# Patient Record
Sex: FEMALE | Race: OTHER | HISPANIC OR LATINO
[De-identification: names, ages, dates, MRNs, and addresses within clinical notes are randomized per-mention and may not be internally consistent; named-entity substitution may affect disease eponyms.]

---

## 2017-03-23 ENCOUNTER — APPOINTMENT (OUTPATIENT)
Dept: INTERNAL MEDICINE | Facility: CLINIC | Age: 67
End: 2017-03-23

## 2017-03-23 VITALS
BODY MASS INDEX: 29.88 KG/M2 | TEMPERATURE: 97.9 F | OXYGEN SATURATION: 98 % | SYSTOLIC BLOOD PRESSURE: 120 MMHG | WEIGHT: 175 LBS | DIASTOLIC BLOOD PRESSURE: 70 MMHG | HEIGHT: 64 IN | HEART RATE: 68 BPM

## 2017-03-23 DIAGNOSIS — H93.13 TINNITUS, BILATERAL: ICD-10-CM

## 2017-03-30 ENCOUNTER — APPOINTMENT (OUTPATIENT)
Dept: INTERNAL MEDICINE | Facility: CLINIC | Age: 67
End: 2017-03-30

## 2017-05-10 ENCOUNTER — APPOINTMENT (OUTPATIENT)
Dept: OTOLARYNGOLOGY | Facility: CLINIC | Age: 67
End: 2017-05-10

## 2018-09-11 ENCOUNTER — EMERGENCY (EMERGENCY)
Facility: HOSPITAL | Age: 68
LOS: 1 days | Discharge: HOME | End: 2018-09-11
Attending: EMERGENCY MEDICINE
Payer: COMMERCIAL

## 2018-09-11 VITALS
DIASTOLIC BLOOD PRESSURE: 91 MMHG | HEART RATE: 72 BPM | OXYGEN SATURATION: 97 % | TEMPERATURE: 98 F | SYSTOLIC BLOOD PRESSURE: 137 MMHG | RESPIRATION RATE: 17 BRPM

## 2018-09-11 VITALS
TEMPERATURE: 98 F | DIASTOLIC BLOOD PRESSURE: 81 MMHG | WEIGHT: 171.96 LBS | HEART RATE: 91 BPM | OXYGEN SATURATION: 97 % | RESPIRATION RATE: 18 BRPM | SYSTOLIC BLOOD PRESSURE: 124 MMHG | HEIGHT: 61.02 IN

## 2018-09-11 PROCEDURE — 99283 EMERGENCY DEPT VISIT LOW MDM: CPT

## 2018-09-11 PROCEDURE — 99283 EMERGENCY DEPT VISIT LOW MDM: CPT | Mod: 25

## 2018-09-11 RX ADMIN — Medication 450 MILLIGRAM(S): at 04:51

## 2018-09-11 NOTE — ED PROVIDER NOTE - PLAN OF CARE
1. Perform warm compresses to R eye, 4 times a day for 15 minutes at a time  2. Take Clindamycin as prescribed, eat yogurt while taking  3. Follow-up with Ophthalmology clinic within next 2-3 days, call [phone] for appointment  4. Return immediately for any worsening or concerning symptoms including any change in vision, pain while moving eye, worsening swelling, bulging of eye, abnormal discharge, fevers/chills, headache, dizziness, or anything else that concerns you

## 2018-09-11 NOTE — ED PROVIDER NOTE - OBJECTIVE STATEMENT
68F no sig pmh presents with R lower eyelid pruritis, pain, swelling x 4 days. gradually worsening redness, swelling. denies pain with eye movement. no contact lens use. "feels like something in eye." no sig d/c. denies double or blurry vision. denies trauma. denies f/c. denies headache, dizziness, claudication. denies n/v/d. denies cp, sob, palpitations. otherwise feels well.

## 2018-09-11 NOTE — ED ADULT TRIAGE NOTE - CHIEF COMPLAINT QUOTE
"I was working last week and I had some dust on my right eye, during the week it was swollen and red. I feel pain"

## 2018-09-11 NOTE — ED PROVIDER NOTE - EYES, MLM
PERRLA, EOMI without pain, +erythema, edema, R lower eyelid, with associated lesion and papule c/w hordeolum, no discharge, +mild edema, ?warmth inferior to R eye, no proptosis. Visual acuity: OD 20/50, OS 20/30 (corrective lenses). Fluorescein stain R eye reveals no evidence of ulcer, abrasion, dendrites.

## 2018-09-11 NOTE — ED ADULT NURSE NOTE - CHPI ED NUR SYMPTOMS NEG
no bleeding gums/no chills/no weakness/no vomiting/no nausea/no loss of consciousness/no numbness/no syncope/no fever

## 2018-09-11 NOTE — ED PROVIDER NOTE - CARE PLAN
Principal Discharge DX:	Preseptal cellulitis of right lower eyelid  Assessment and plan of treatment:	1. Perform warm compresses to R eye, 4 times a day for 15 minutes at a time  2. Take Clindamycin as prescribed, eat yogurt while taking  3. Follow-up with Ophthalmology clinic within next 2-3 days, call [phone] for appointment  4. Return immediately for any worsening or concerning symptoms including any change in vision, pain while moving eye, worsening swelling, bulging of eye, abnormal discharge, fevers/chills, headache, dizziness, or anything else that concerns you  Secondary Diagnosis:	Hordeolum externum of right lower eyelid

## 2018-09-11 NOTE — ED ADULT NURSE NOTE - OBJECTIVE STATEMENT
68y female presents ot ED complaining of eye pain and discomfort. Pt states that on Friday she felt like she got some dust in her eye on Friday and since then has developed increased tearing, itching and pain. Pt is a/ox3 with red bump on lower eyelid of R eye. No changes in vision

## 2018-09-11 NOTE — ED PROVIDER NOTE - MEDICAL DECISION MAKING DETAILS
R lower eyelid lesion c/w hordeolum. No other lesions noted. Exam and hx not c/w orbital cellulitis. No findings c/w corneal abrasion. No sig visual acuity deficit. ?developing preseptal cellulitis, plan to treat with clindamycin. To d/c with conservative management and ophthalmology f/u. - Savage Patrick MD

## 2018-09-11 NOTE — ED PROVIDER NOTE - NS ED ROS FT
Constitutional: No fever or chills  Eyes: Per HPI  HEENT: No throat pain, no trismus, no sore throat, no earache  CV: No chest pain  Resp: No SOB no cough  GI: No abd pain, nausea or vomiting  MSK: No neck pain  Neuro: No headache

## 2018-09-12 NOTE — ED POST DISCHARGE NOTE - ADDITIONAL DOCUMENTATION
On review of chart, noted that clindamycin had incorreclty been sent to pharmacy to be taken QD instead of TID. tried calling several listed numbers, unable to get through. left message on pt's listed main number ([phone]) on HIE, left message on voicemail with instructions for correctly taking medication (450 mg TID x 7 days) and instructed to call [facility] ED with any questions. additional meds sent to patient's pharmacy. - Savage Patrick MD

## 2020-08-13 NOTE — ED PROVIDER NOTE - NS ED MD DISPO DISCHARGE CCDA
-We have discussed today with you Diabetes Management, recommendations as follow:   *Continue taking Glucophage  XR 2 grams at night deo night   *Start taking Jardiance 10 mgh oral every morning     *Stop taking Glimepiride     -Continue healthy lifestyle, Including diet and physical activity   -Continue taking antihypertensive medications: Irbesartan 150 mg oral daily and hydrochlorothiazide 25 mg oral daily   -Continue taking Metoprolol  oral daily, Atorvastatin 20 mg po daily, Aspirin 81 mg daily   -Will order blood work today and will follow in 1 months Patient/Caregiver provided printed discharge information.

## 2023-08-04 NOTE — ED ADULT NURSE NOTE - CHIEF COMPLAINT
Called patient to confirm patient has paper order for upcoming CT CAC Scan and patient wanted appointment cancelled. Reported had other testing done. Appointment cancelled per patient request.   The patient is a 68y Female complaining of

## 2023-09-03 ENCOUNTER — EMERGENCY (EMERGENCY)
Facility: HOSPITAL | Age: 73
LOS: 1 days | Discharge: HOME | End: 2023-09-03
Attending: EMERGENCY MEDICINE
Payer: MEDICARE

## 2023-09-03 VITALS
WEIGHT: 188.72 LBS | HEART RATE: 91 BPM | HEIGHT: 61.42 IN | RESPIRATION RATE: 15 BRPM | TEMPERATURE: 98 F | DIASTOLIC BLOOD PRESSURE: 83 MMHG | SYSTOLIC BLOOD PRESSURE: 147 MMHG | OXYGEN SATURATION: 96 %

## 2023-09-03 PROCEDURE — 99284 EMERGENCY DEPT VISIT MOD MDM: CPT

## 2023-09-03 PROCEDURE — 73562 X-RAY EXAM OF KNEE 3: CPT | Mod: 26,RT

## 2023-09-03 RX ORDER — KETOROLAC TROMETHAMINE 30 MG/ML
15 SYRINGE (ML) INJECTION ONCE
Refills: 0 | Status: DISCONTINUED | OUTPATIENT
Start: 2023-09-03 | End: 2023-09-03

## 2023-09-03 RX ORDER — ACETAMINOPHEN 500 MG
975 TABLET ORAL ONCE
Refills: 0 | Status: COMPLETED | OUTPATIENT
Start: 2023-09-03 | End: 2023-09-03

## 2023-09-04 LAB
ALBUMIN SERPL ELPH-MCNC: 3.4 G/DL — LOW (ref 3.5–5)
ALP SERPL-CCNC: 78 U/L — SIGNIFICANT CHANGE UP (ref 40–120)
ALT FLD-CCNC: 23 U/L DA — SIGNIFICANT CHANGE UP (ref 10–60)
ANION GAP SERPL CALC-SCNC: 4 MMOL/L — LOW (ref 5–17)
AST SERPL-CCNC: 15 U/L — SIGNIFICANT CHANGE UP (ref 10–40)
BASOPHILS # BLD AUTO: 0.04 K/UL — SIGNIFICANT CHANGE UP (ref 0–0.2)
BASOPHILS NFR BLD AUTO: 0.4 % — SIGNIFICANT CHANGE UP (ref 0–2)
BILIRUB SERPL-MCNC: 0.2 MG/DL — SIGNIFICANT CHANGE UP (ref 0.2–1.2)
BUN SERPL-MCNC: 20 MG/DL — HIGH (ref 7–18)
CALCIUM SERPL-MCNC: 9 MG/DL — SIGNIFICANT CHANGE UP (ref 8.4–10.5)
CHLORIDE SERPL-SCNC: 110 MMOL/L — HIGH (ref 96–108)
CO2 SERPL-SCNC: 26 MMOL/L — SIGNIFICANT CHANGE UP (ref 22–31)
CREAT SERPL-MCNC: 1 MG/DL — SIGNIFICANT CHANGE UP (ref 0.5–1.3)
D DIMER BLD IA.RAPID-MCNC: <150 NG/ML DDU — SIGNIFICANT CHANGE UP
EGFR: 60 ML/MIN/1.73M2 — SIGNIFICANT CHANGE UP
EOSINOPHIL # BLD AUTO: 0.21 K/UL — SIGNIFICANT CHANGE UP (ref 0–0.5)
EOSINOPHIL NFR BLD AUTO: 1.9 % — SIGNIFICANT CHANGE UP (ref 0–6)
GLUCOSE SERPL-MCNC: 115 MG/DL — HIGH (ref 70–99)
HCT VFR BLD CALC: 39.1 % — SIGNIFICANT CHANGE UP (ref 34.5–45)
HGB BLD-MCNC: 12.6 G/DL — SIGNIFICANT CHANGE UP (ref 11.5–15.5)
IMM GRANULOCYTES NFR BLD AUTO: 0.3 % — SIGNIFICANT CHANGE UP (ref 0–0.9)
LYMPHOCYTES # BLD AUTO: 35.5 % — SIGNIFICANT CHANGE UP (ref 13–44)
LYMPHOCYTES # BLD AUTO: 4.01 K/UL — HIGH (ref 1–3.3)
MCHC RBC-ENTMCNC: 30.1 PG — SIGNIFICANT CHANGE UP (ref 27–34)
MCHC RBC-ENTMCNC: 32.2 GM/DL — SIGNIFICANT CHANGE UP (ref 32–36)
MCV RBC AUTO: 93.3 FL — SIGNIFICANT CHANGE UP (ref 80–100)
MONOCYTES # BLD AUTO: 0.62 K/UL — SIGNIFICANT CHANGE UP (ref 0–0.9)
MONOCYTES NFR BLD AUTO: 5.5 % — SIGNIFICANT CHANGE UP (ref 2–14)
NEUTROPHILS # BLD AUTO: 6.4 K/UL — SIGNIFICANT CHANGE UP (ref 1.8–7.4)
NEUTROPHILS NFR BLD AUTO: 56.4 % — SIGNIFICANT CHANGE UP (ref 43–77)
NRBC # BLD: 0 /100 WBCS — SIGNIFICANT CHANGE UP (ref 0–0)
PLATELET # BLD AUTO: 298 K/UL — SIGNIFICANT CHANGE UP (ref 150–400)
POTASSIUM SERPL-MCNC: 4.4 MMOL/L — SIGNIFICANT CHANGE UP (ref 3.5–5.3)
POTASSIUM SERPL-SCNC: 4.4 MMOL/L — SIGNIFICANT CHANGE UP (ref 3.5–5.3)
PROT SERPL-MCNC: 7.4 G/DL — SIGNIFICANT CHANGE UP (ref 6–8.3)
RBC # BLD: 4.19 M/UL — SIGNIFICANT CHANGE UP (ref 3.8–5.2)
RBC # FLD: 13.9 % — SIGNIFICANT CHANGE UP (ref 10.3–14.5)
SODIUM SERPL-SCNC: 140 MMOL/L — SIGNIFICANT CHANGE UP (ref 135–145)
WBC # BLD: 11.31 K/UL — HIGH (ref 3.8–10.5)
WBC # FLD AUTO: 11.31 K/UL — HIGH (ref 3.8–10.5)

## 2023-09-04 PROCEDURE — 36415 COLL VENOUS BLD VENIPUNCTURE: CPT

## 2023-09-04 PROCEDURE — 85379 FIBRIN DEGRADATION QUANT: CPT

## 2023-09-04 PROCEDURE — 73562 X-RAY EXAM OF KNEE 3: CPT

## 2023-09-04 PROCEDURE — 85025 COMPLETE CBC W/AUTO DIFF WBC: CPT

## 2023-09-04 PROCEDURE — 99283 EMERGENCY DEPT VISIT LOW MDM: CPT | Mod: 25

## 2023-09-04 PROCEDURE — 80053 COMPREHEN METABOLIC PANEL: CPT

## 2023-09-04 RX ADMIN — Medication 975 MILLIGRAM(S): at 02:28

## 2023-09-04 NOTE — ED PROVIDER NOTE - NSFOLLOWUPINSTRUCTIONS_ED_ALL_ED_FT
Return to [facility] imaging center during daytime hours 9am-5pm for doppler study-bring requisition form with you.  For pain continue tylenol 650mg or ibuprofen 600mg every 6 hours.  Followup with PMD for reevaluation.  Return to ED if you develop worsening symptoms-high fevers, severe pain/swelling, chest pain or difficulty breathing.

## 2023-09-04 NOTE — ED PROVIDER NOTE - CLINICAL SUMMARY MEDICAL DECISION MAKING FREE TEXT BOX
73-year-old female with no past medical history presents with 20 days of right posterior knee pain. Discussed with patient unavailability of venous coppler study at this time.  lab interpretation- ddimer wnl, wbc 11k, Cr 1.0  imaging interpretation- R knee XR shows no displaced fracture/dislocation.  Discussed above with patient/family  patient stable for discharge with requisition form for venous doppler.

## 2023-09-04 NOTE — ED PROVIDER NOTE - PATIENT PORTAL LINK FT
You can access the FollowMyHealth Patient Portal offered by [facility] by registering at the following website: [email or web address]. By joining [employer]’s FollowMyHealth portal, you will also be able to view your health information using other applications (apps) compatible with our system.

## 2023-09-04 NOTE — ED PROVIDER NOTE - PHYSICAL EXAMINATION
MSK: Right lower extremity- Pain with knee flexion.  No tenderness, no erythema her lactate came forearm.  2+ DP pulse, cap refill less than 2 seconds at the distal toes.  Normal sensorimotor function of the foot. No calf ttp, neg homans sign.

## 2023-09-04 NOTE — ED ADULT NURSE NOTE - NSFALLUNIVINTERV_ED_ALL_ED
Bed/Stretcher in lowest position, wheels locked, appropriate side rails in place/Call bell, personal items and telephone in reach/Instruct patient to call for assistance before getting out of bed/chair/stretcher/Non-slip footwear applied when patient is off stretcher/[place] to call system/Physically safe environment - no spills, clutter or unnecessary equipment/Purposeful proactive rounding/Room/bathroom lighting operational, light cord in reach

## 2023-09-04 NOTE — ED PROVIDER NOTE - OBJECTIVE STATEMENT
73-year-old female with no past medical history presents with 20 days of right knee pain.  Patient reports pain behind her right knee for the last 20 days.  Denies any trauma.  Reports that it started after she spent a day cleaning out her basement and she was very active.  Denies similar pain in the past.  Does report some worsening swelling of the right leg associated with the pain.  Reports pain is exacerbated by walking and bending the knee.  Denies any color changes of her leg, weakness or numbness.  Denies any back pain.  Denies any recent chest pain or shortness of breath.  Denies fevers. Evaluation performed in [place]ian language by me.

## 2023-09-04 NOTE — ED ADULT NURSE NOTE - OBJECTIVE STATEMENT
As per patient c/o right leg pain x20 days. Patient reports RLE pain x20 days which worsened today. Pt denies any chest pain SOB, dyspnea on exertion. No apparent distress noted. Pt denies all other signs and symptoms.

## 2023-09-07 ENCOUNTER — OUTPATIENT (OUTPATIENT)
Dept: OUTPATIENT SERVICES | Facility: HOSPITAL | Age: 73
LOS: 1 days | End: 2023-09-07
Payer: MEDICARE

## 2023-09-07 ENCOUNTER — APPOINTMENT (OUTPATIENT)
Dept: ULTRASOUND IMAGING | Facility: HOSPITAL | Age: 73
End: 2023-09-07

## 2023-09-07 DIAGNOSIS — I82.409 ACUTE EMBOLISM AND THROMBOSIS OF UNSPECIFIED DEEP VEINS OF UNSPECIFIED LOWER EXTREMITY: ICD-10-CM

## 2023-09-07 PROCEDURE — 93971 EXTREMITY STUDY: CPT | Mod: 26,RT

## 2023-09-07 PROCEDURE — 93971 EXTREMITY STUDY: CPT
